# Patient Record
Sex: FEMALE | Race: WHITE | Employment: OTHER | URBAN - METROPOLITAN AREA
[De-identification: names, ages, dates, MRNs, and addresses within clinical notes are randomized per-mention and may not be internally consistent; named-entity substitution may affect disease eponyms.]

---

## 2018-08-27 ENCOUNTER — HOME HEALTH ADMISSION (OUTPATIENT)
Dept: HOME HEALTH SERVICES | Facility: HOME HEALTH | Age: 83
End: 2018-08-27
Payer: MEDICARE

## 2018-08-30 ENCOUNTER — HOME CARE VISIT (OUTPATIENT)
Dept: SCHEDULING | Facility: HOME HEALTH | Age: 83
End: 2018-08-30
Payer: MEDICARE

## 2018-08-30 VITALS
RESPIRATION RATE: 16 BRPM | SYSTOLIC BLOOD PRESSURE: 132 MMHG | DIASTOLIC BLOOD PRESSURE: 74 MMHG | HEART RATE: 75 BPM | OXYGEN SATURATION: 98 % | TEMPERATURE: 98.4 F

## 2018-08-30 PROCEDURE — 3331090002 HH PPS REVENUE DEBIT

## 2018-08-30 PROCEDURE — G0299 HHS/HOSPICE OF RN EA 15 MIN: HCPCS

## 2018-08-30 PROCEDURE — 3331090001 HH PPS REVENUE CREDIT

## 2018-08-30 PROCEDURE — 400013 HH SOC

## 2018-08-31 PROCEDURE — 3331090001 HH PPS REVENUE CREDIT

## 2018-08-31 PROCEDURE — 3331090002 HH PPS REVENUE DEBIT

## 2018-09-01 ENCOUNTER — HOME CARE VISIT (OUTPATIENT)
Dept: SCHEDULING | Facility: HOME HEALTH | Age: 83
End: 2018-09-01
Payer: MEDICARE

## 2018-09-01 PROCEDURE — 3331090002 HH PPS REVENUE DEBIT

## 2018-09-01 PROCEDURE — 3331090001 HH PPS REVENUE CREDIT

## 2018-09-02 PROCEDURE — 3331090002 HH PPS REVENUE DEBIT

## 2018-09-02 PROCEDURE — 3331090001 HH PPS REVENUE CREDIT

## 2018-09-03 ENCOUNTER — HOME CARE VISIT (OUTPATIENT)
Dept: HOME HEALTH SERVICES | Facility: HOME HEALTH | Age: 83
End: 2018-09-03
Payer: MEDICARE

## 2018-09-03 PROCEDURE — 3331090001 HH PPS REVENUE CREDIT

## 2018-09-03 PROCEDURE — 3331090002 HH PPS REVENUE DEBIT

## 2018-09-04 ENCOUNTER — HOME CARE VISIT (OUTPATIENT)
Dept: SCHEDULING | Facility: HOME HEALTH | Age: 83
End: 2018-09-04
Payer: MEDICARE

## 2018-09-04 VITALS
RESPIRATION RATE: 16 BRPM | TEMPERATURE: 98.8 F | OXYGEN SATURATION: 98 % | SYSTOLIC BLOOD PRESSURE: 128 MMHG | DIASTOLIC BLOOD PRESSURE: 70 MMHG

## 2018-09-04 PROCEDURE — 3331090001 HH PPS REVENUE CREDIT

## 2018-09-04 PROCEDURE — G0152 HHCP-SERV OF OT,EA 15 MIN: HCPCS

## 2018-09-04 PROCEDURE — 3331090002 HH PPS REVENUE DEBIT

## 2018-09-04 PROCEDURE — G0151 HHCP-SERV OF PT,EA 15 MIN: HCPCS

## 2018-09-05 VITALS
SYSTOLIC BLOOD PRESSURE: 130 MMHG | HEART RATE: 80 BPM | DIASTOLIC BLOOD PRESSURE: 76 MMHG | TEMPERATURE: 98.4 F | OXYGEN SATURATION: 96 % | RESPIRATION RATE: 24 BRPM

## 2018-09-05 PROCEDURE — 3331090001 HH PPS REVENUE CREDIT

## 2018-09-05 PROCEDURE — 3331090002 HH PPS REVENUE DEBIT

## 2018-09-06 ENCOUNTER — HOME CARE VISIT (OUTPATIENT)
Dept: SCHEDULING | Facility: HOME HEALTH | Age: 83
End: 2018-09-06
Payer: MEDICARE

## 2018-09-06 VITALS
RESPIRATION RATE: 18 BRPM | OXYGEN SATURATION: 96 % | DIASTOLIC BLOOD PRESSURE: 90 MMHG | SYSTOLIC BLOOD PRESSURE: 119 MMHG | TEMPERATURE: 98.3 F | HEART RATE: 87 BPM

## 2018-09-06 PROCEDURE — G0300 HHS/HOSPICE OF LPN EA 15 MIN: HCPCS

## 2018-09-06 PROCEDURE — 3331090001 HH PPS REVENUE CREDIT

## 2018-09-06 PROCEDURE — 3331090002 HH PPS REVENUE DEBIT

## 2018-09-06 PROCEDURE — G0152 HHCP-SERV OF OT,EA 15 MIN: HCPCS

## 2018-09-07 ENCOUNTER — HOME CARE VISIT (OUTPATIENT)
Dept: SCHEDULING | Facility: HOME HEALTH | Age: 83
End: 2018-09-07
Payer: MEDICARE

## 2018-09-07 ENCOUNTER — HOME CARE VISIT (OUTPATIENT)
Dept: HOME HEALTH SERVICES | Facility: HOME HEALTH | Age: 83
End: 2018-09-07
Payer: MEDICARE

## 2018-09-07 VITALS
OXYGEN SATURATION: 96 % | SYSTOLIC BLOOD PRESSURE: 147 MMHG | HEART RATE: 81 BPM | TEMPERATURE: 98 F | DIASTOLIC BLOOD PRESSURE: 85 MMHG

## 2018-09-07 VITALS
RESPIRATION RATE: 18 BRPM | OXYGEN SATURATION: 99 % | SYSTOLIC BLOOD PRESSURE: 110 MMHG | DIASTOLIC BLOOD PRESSURE: 70 MMHG | HEART RATE: 77 BPM | TEMPERATURE: 98.1 F

## 2018-09-07 PROCEDURE — G0300 HHS/HOSPICE OF LPN EA 15 MIN: HCPCS

## 2018-09-07 PROCEDURE — 3331090001 HH PPS REVENUE CREDIT

## 2018-09-07 PROCEDURE — G0157 HHC PT ASSISTANT EA 15: HCPCS

## 2018-09-07 PROCEDURE — 3331090002 HH PPS REVENUE DEBIT

## 2018-09-08 VITALS
RESPIRATION RATE: 18 BRPM | HEART RATE: 82 BPM | DIASTOLIC BLOOD PRESSURE: 70 MMHG | TEMPERATURE: 98.2 F | OXYGEN SATURATION: 95 % | SYSTOLIC BLOOD PRESSURE: 115 MMHG

## 2018-09-08 PROCEDURE — 3331090002 HH PPS REVENUE DEBIT

## 2018-09-08 PROCEDURE — 3331090001 HH PPS REVENUE CREDIT

## 2018-09-09 PROCEDURE — 3331090002 HH PPS REVENUE DEBIT

## 2018-09-09 PROCEDURE — 3331090001 HH PPS REVENUE CREDIT

## 2018-09-10 ENCOUNTER — HOME CARE VISIT (OUTPATIENT)
Dept: HOME HEALTH SERVICES | Facility: HOME HEALTH | Age: 83
End: 2018-09-10
Payer: MEDICARE

## 2018-09-10 ENCOUNTER — HOME CARE VISIT (OUTPATIENT)
Dept: SCHEDULING | Facility: HOME HEALTH | Age: 83
End: 2018-09-10
Payer: MEDICARE

## 2018-09-10 VITALS
TEMPERATURE: 98.7 F | OXYGEN SATURATION: 96 % | DIASTOLIC BLOOD PRESSURE: 85 MMHG | RESPIRATION RATE: 18 BRPM | HEART RATE: 75 BPM | SYSTOLIC BLOOD PRESSURE: 144 MMHG

## 2018-09-10 PROCEDURE — 3331090001 HH PPS REVENUE CREDIT

## 2018-09-10 PROCEDURE — 3331090002 HH PPS REVENUE DEBIT

## 2018-09-10 PROCEDURE — G0152 HHCP-SERV OF OT,EA 15 MIN: HCPCS

## 2018-09-10 PROCEDURE — G0300 HHS/HOSPICE OF LPN EA 15 MIN: HCPCS

## 2018-09-11 PROCEDURE — 3331090001 HH PPS REVENUE CREDIT

## 2018-09-11 PROCEDURE — 3331090002 HH PPS REVENUE DEBIT

## 2018-09-12 ENCOUNTER — HOME CARE VISIT (OUTPATIENT)
Dept: HOME HEALTH SERVICES | Facility: HOME HEALTH | Age: 83
End: 2018-09-12
Payer: MEDICARE

## 2018-09-12 ENCOUNTER — HOME CARE VISIT (OUTPATIENT)
Dept: SCHEDULING | Facility: HOME HEALTH | Age: 83
End: 2018-09-12
Payer: MEDICARE

## 2018-09-12 VITALS
HEART RATE: 68 BPM | SYSTOLIC BLOOD PRESSURE: 138 MMHG | OXYGEN SATURATION: 96 % | DIASTOLIC BLOOD PRESSURE: 62 MMHG | RESPIRATION RATE: 16 BRPM | TEMPERATURE: 98.4 F

## 2018-09-12 PROCEDURE — G0157 HHC PT ASSISTANT EA 15: HCPCS

## 2018-09-12 PROCEDURE — 3331090002 HH PPS REVENUE DEBIT

## 2018-09-12 PROCEDURE — G0152 HHCP-SERV OF OT,EA 15 MIN: HCPCS

## 2018-09-12 PROCEDURE — 3331090001 HH PPS REVENUE CREDIT

## 2018-09-13 ENCOUNTER — HOME CARE VISIT (OUTPATIENT)
Dept: SCHEDULING | Facility: HOME HEALTH | Age: 83
End: 2018-09-13
Payer: MEDICARE

## 2018-09-13 PROCEDURE — G0300 HHS/HOSPICE OF LPN EA 15 MIN: HCPCS

## 2018-09-13 PROCEDURE — 3331090002 HH PPS REVENUE DEBIT

## 2018-09-13 PROCEDURE — 3331090001 HH PPS REVENUE CREDIT

## 2018-09-14 ENCOUNTER — HOME CARE VISIT (OUTPATIENT)
Dept: SCHEDULING | Facility: HOME HEALTH | Age: 83
End: 2018-09-14
Payer: MEDICARE

## 2018-09-14 VITALS
HEART RATE: 81 BPM | DIASTOLIC BLOOD PRESSURE: 73 MMHG | SYSTOLIC BLOOD PRESSURE: 134 MMHG | TEMPERATURE: 98.5 F | OXYGEN SATURATION: 98 %

## 2018-09-14 PROCEDURE — 3331090001 HH PPS REVENUE CREDIT

## 2018-09-14 PROCEDURE — 3331090002 HH PPS REVENUE DEBIT

## 2018-09-14 PROCEDURE — G0157 HHC PT ASSISTANT EA 15: HCPCS

## 2018-09-15 PROCEDURE — 3331090002 HH PPS REVENUE DEBIT

## 2018-09-15 PROCEDURE — 3331090001 HH PPS REVENUE CREDIT

## 2018-09-16 ENCOUNTER — HOME CARE VISIT (OUTPATIENT)
Dept: HOME HEALTH SERVICES | Facility: HOME HEALTH | Age: 83
End: 2018-09-16
Payer: MEDICARE

## 2018-09-16 VITALS
RESPIRATION RATE: 18 BRPM | DIASTOLIC BLOOD PRESSURE: 80 MMHG | TEMPERATURE: 98.1 F | SYSTOLIC BLOOD PRESSURE: 140 MMHG | OXYGEN SATURATION: 98 % | OXYGEN SATURATION: 96 % | DIASTOLIC BLOOD PRESSURE: 72 MMHG | SYSTOLIC BLOOD PRESSURE: 124 MMHG | RESPIRATION RATE: 18 BRPM | HEART RATE: 71 BPM | TEMPERATURE: 98.1 F | HEART RATE: 76 BPM

## 2018-09-16 PROCEDURE — 3331090001 HH PPS REVENUE CREDIT

## 2018-09-16 PROCEDURE — 3331090002 HH PPS REVENUE DEBIT

## 2018-09-17 PROCEDURE — 3331090001 HH PPS REVENUE CREDIT

## 2018-09-17 PROCEDURE — 3331090002 HH PPS REVENUE DEBIT

## 2018-09-18 ENCOUNTER — HOME CARE VISIT (OUTPATIENT)
Dept: SCHEDULING | Facility: HOME HEALTH | Age: 83
End: 2018-09-18
Payer: MEDICARE

## 2018-09-18 ENCOUNTER — HOME CARE VISIT (OUTPATIENT)
Dept: HOME HEALTH SERVICES | Facility: HOME HEALTH | Age: 83
End: 2018-09-18
Payer: MEDICARE

## 2018-09-18 VITALS
TEMPERATURE: 98.7 F | DIASTOLIC BLOOD PRESSURE: 78 MMHG | OXYGEN SATURATION: 97 % | RESPIRATION RATE: 17 BRPM | SYSTOLIC BLOOD PRESSURE: 150 MMHG | HEART RATE: 84 BPM

## 2018-09-18 PROCEDURE — G0157 HHC PT ASSISTANT EA 15: HCPCS

## 2018-09-18 PROCEDURE — 3331090001 HH PPS REVENUE CREDIT

## 2018-09-18 PROCEDURE — G0300 HHS/HOSPICE OF LPN EA 15 MIN: HCPCS

## 2018-09-18 PROCEDURE — 3331090002 HH PPS REVENUE DEBIT

## 2018-09-19 ENCOUNTER — HOME CARE VISIT (OUTPATIENT)
Dept: SCHEDULING | Facility: HOME HEALTH | Age: 83
End: 2018-09-19
Payer: MEDICARE

## 2018-09-19 VITALS
SYSTOLIC BLOOD PRESSURE: 122 MMHG | RESPIRATION RATE: 18 BRPM | OXYGEN SATURATION: 97 % | TEMPERATURE: 97.9 F | HEART RATE: 70 BPM | DIASTOLIC BLOOD PRESSURE: 70 MMHG

## 2018-09-19 VITALS
SYSTOLIC BLOOD PRESSURE: 129 MMHG | OXYGEN SATURATION: 98 % | HEART RATE: 88 BPM | DIASTOLIC BLOOD PRESSURE: 71 MMHG | TEMPERATURE: 97.5 F | RESPIRATION RATE: 17 BRPM

## 2018-09-19 PROCEDURE — 3331090001 HH PPS REVENUE CREDIT

## 2018-09-19 PROCEDURE — 3331090002 HH PPS REVENUE DEBIT

## 2018-09-19 PROCEDURE — G0157 HHC PT ASSISTANT EA 15: HCPCS

## 2018-09-20 ENCOUNTER — HOME CARE VISIT (OUTPATIENT)
Dept: SCHEDULING | Facility: HOME HEALTH | Age: 83
End: 2018-09-20
Payer: MEDICARE

## 2018-09-20 ENCOUNTER — HOME CARE VISIT (OUTPATIENT)
Dept: HOME HEALTH SERVICES | Facility: HOME HEALTH | Age: 83
End: 2018-09-20
Payer: MEDICARE

## 2018-09-20 PROCEDURE — G0300 HHS/HOSPICE OF LPN EA 15 MIN: HCPCS

## 2018-09-20 PROCEDURE — 3331090001 HH PPS REVENUE CREDIT

## 2018-09-20 PROCEDURE — 3331090002 HH PPS REVENUE DEBIT

## 2018-09-21 PROCEDURE — 3331090002 HH PPS REVENUE DEBIT

## 2018-09-21 PROCEDURE — 3331090001 HH PPS REVENUE CREDIT

## 2018-09-22 PROCEDURE — 3331090001 HH PPS REVENUE CREDIT

## 2018-09-22 PROCEDURE — 3331090002 HH PPS REVENUE DEBIT

## 2018-09-23 PROCEDURE — 3331090001 HH PPS REVENUE CREDIT

## 2018-09-23 PROCEDURE — 3331090002 HH PPS REVENUE DEBIT

## 2018-09-24 ENCOUNTER — HOME CARE VISIT (OUTPATIENT)
Dept: SCHEDULING | Facility: HOME HEALTH | Age: 83
End: 2018-09-24
Payer: MEDICARE

## 2018-09-24 VITALS
OXYGEN SATURATION: 97 % | TEMPERATURE: 98.2 F | DIASTOLIC BLOOD PRESSURE: 85 MMHG | RESPIRATION RATE: 17 BRPM | HEART RATE: 79 BPM | SYSTOLIC BLOOD PRESSURE: 140 MMHG

## 2018-09-24 PROCEDURE — 3331090002 HH PPS REVENUE DEBIT

## 2018-09-24 PROCEDURE — 3331090001 HH PPS REVENUE CREDIT

## 2018-09-24 PROCEDURE — G0157 HHC PT ASSISTANT EA 15: HCPCS

## 2018-09-25 PROCEDURE — 3331090001 HH PPS REVENUE CREDIT

## 2018-09-25 PROCEDURE — 3331090002 HH PPS REVENUE DEBIT

## 2018-09-26 ENCOUNTER — HOME CARE VISIT (OUTPATIENT)
Dept: HOME HEALTH SERVICES | Facility: HOME HEALTH | Age: 83
End: 2018-09-26
Payer: MEDICARE

## 2018-09-26 ENCOUNTER — HOME CARE VISIT (OUTPATIENT)
Dept: SCHEDULING | Facility: HOME HEALTH | Age: 83
End: 2018-09-26
Payer: MEDICARE

## 2018-09-26 PROCEDURE — G0151 HHCP-SERV OF PT,EA 15 MIN: HCPCS

## 2018-09-26 PROCEDURE — G0300 HHS/HOSPICE OF LPN EA 15 MIN: HCPCS

## 2018-09-26 PROCEDURE — 3331090001 HH PPS REVENUE CREDIT

## 2018-09-26 PROCEDURE — 3331090002 HH PPS REVENUE DEBIT

## 2018-09-27 VITALS
OXYGEN SATURATION: 97 % | DIASTOLIC BLOOD PRESSURE: 70 MMHG | TEMPERATURE: 97.8 F | HEART RATE: 108 BPM | SYSTOLIC BLOOD PRESSURE: 122 MMHG | RESPIRATION RATE: 20 BRPM

## 2018-09-27 PROCEDURE — 3331090002 HH PPS REVENUE DEBIT

## 2018-09-27 PROCEDURE — 3331090001 HH PPS REVENUE CREDIT

## 2018-09-28 PROCEDURE — 3331090002 HH PPS REVENUE DEBIT

## 2018-09-28 PROCEDURE — 3331090001 HH PPS REVENUE CREDIT

## 2018-09-29 PROCEDURE — 3331090002 HH PPS REVENUE DEBIT

## 2018-09-29 PROCEDURE — 3331090001 HH PPS REVENUE CREDIT

## 2018-09-30 PROCEDURE — 3331090002 HH PPS REVENUE DEBIT

## 2018-09-30 PROCEDURE — 3331090001 HH PPS REVENUE CREDIT

## 2018-10-01 VITALS
OXYGEN SATURATION: 99 % | HEART RATE: 80 BPM | OXYGEN SATURATION: 97 % | SYSTOLIC BLOOD PRESSURE: 130 MMHG | HEART RATE: 88 BPM | DIASTOLIC BLOOD PRESSURE: 70 MMHG | TEMPERATURE: 98.4 F | RESPIRATION RATE: 18 BRPM | RESPIRATION RATE: 18 BRPM | SYSTOLIC BLOOD PRESSURE: 126 MMHG | DIASTOLIC BLOOD PRESSURE: 68 MMHG | TEMPERATURE: 98.5 F

## 2020-05-24 ENCOUNTER — HOSPITAL ENCOUNTER (EMERGENCY)
Age: 85
Discharge: HOME | End: 2020-05-24
Attending: EMERGENCY MEDICINE
Payer: MEDICARE

## 2020-05-24 ENCOUNTER — APPOINTMENT (OUTPATIENT)
Dept: GENERAL RADIOLOGY | Age: 85
End: 2020-05-24
Attending: NURSE PRACTITIONER
Payer: MEDICARE

## 2020-05-24 VITALS
BODY MASS INDEX: 25.71 KG/M2 | SYSTOLIC BLOOD PRESSURE: 153 MMHG | HEART RATE: 74 BPM | HEIGHT: 64 IN | DIASTOLIC BLOOD PRESSURE: 83 MMHG | TEMPERATURE: 98.7 F | OXYGEN SATURATION: 100 % | RESPIRATION RATE: 16 BRPM | WEIGHT: 150.6 LBS

## 2020-05-24 DIAGNOSIS — E87.6 ACUTE HYPOKALEMIA: ICD-10-CM
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
## 2020-05-24 DIAGNOSIS — S51.011A SKIN TEAR OF RIGHT ELBOW WITHOUT COMPLICATION, INITIAL ENCOUNTER: ICD-10-CM

## 2020-05-24 LAB
ALBUMIN SERPL-MCNC: 3.8 G/DL (ref 3.5–5)
ALBUMIN/GLOB SERPL: 1.2 {RATIO} (ref 1.1–2.2)
ALP SERPL-CCNC: 134 U/L (ref 45–117)
ALT SERPL-CCNC: 25 U/L (ref 12–78)
ANION GAP SERPL CALC-SCNC: 9 MMOL/L (ref 5–15)
APPEARANCE UR: CLEAR
AST SERPL-CCNC: 30 U/L (ref 15–37)
BACTERIA URNS QL MICRO: NEGATIVE /HPF
BASOPHILS # BLD: 0.1 K/UL (ref 0–0.1)
BASOPHILS NFR BLD: 1 % (ref 0–1)
BILIRUB SERPL-MCNC: 0.7 MG/DL (ref 0.2–1)
BILIRUB UR QL: NEGATIVE
BUN SERPL-MCNC: 16 MG/DL (ref 6–20)
BUN/CREAT SERPL: 34 (ref 12–20)
CALCIUM SERPL-MCNC: 8.8 MG/DL (ref 8.5–10.1)
CHLORIDE SERPL-SCNC: 91 MMOL/L (ref 97–108)
CO2 SERPL-SCNC: 30 MMOL/L (ref 21–32)
COLOR UR: NORMAL
CREAT SERPL-MCNC: 0.47 MG/DL (ref 0.55–1.02)
DIFFERENTIAL METHOD BLD: ABNORMAL
EOSINOPHIL # BLD: 0.1 K/UL (ref 0–0.4)
EOSINOPHIL NFR BLD: 2 % (ref 0–7)
EPITH CASTS URNS QL MICRO: NORMAL /LPF
ERYTHROCYTE [DISTWIDTH] IN BLOOD BY AUTOMATED COUNT: 13.2 % (ref 11.5–14.5)
GLOBULIN SER CALC-MCNC: 3.1 G/DL (ref 2–4)
GLUCOSE SERPL-MCNC: 101 MG/DL (ref 65–100)
GLUCOSE UR STRIP.AUTO-MCNC: NEGATIVE MG/DL
HCT VFR BLD AUTO: 38.5 % (ref 35–47)
HGB BLD-MCNC: 13.1 G/DL (ref 11.5–16)
HGB UR QL STRIP: NEGATIVE
IMM GRANULOCYTES # BLD AUTO: 0 K/UL (ref 0–0.04)
IMM GRANULOCYTES NFR BLD AUTO: 1 % (ref 0–0.5)
KETONES UR QL STRIP.AUTO: NEGATIVE MG/DL
LEUKOCYTE ESTERASE UR QL STRIP.AUTO: NEGATIVE
LYMPHOCYTES # BLD: 0.8 K/UL (ref 0.8–3.5)
LYMPHOCYTES NFR BLD: 12 % (ref 12–49)
MAGNESIUM SERPL-MCNC: 1.9 MG/DL (ref 1.6–2.4)
MCH RBC QN AUTO: 28.9 PG (ref 26–34)
MCHC RBC AUTO-ENTMCNC: 34 G/DL (ref 30–36.5)
MCV RBC AUTO: 84.8 FL (ref 80–99)
MONOCYTES # BLD: 0.6 K/UL (ref 0–1)
MONOCYTES NFR BLD: 8 % (ref 5–13)
NEUTS SEG # BLD: 5.6 K/UL (ref 1.8–8)
NEUTS SEG NFR BLD: 76 % (ref 32–75)
NITRITE UR QL STRIP.AUTO: NEGATIVE
NRBC # BLD: 0 K/UL (ref 0–0.01)
NRBC BLD-RTO: 0 PER 100 WBC
PH UR STRIP: 7.5 [PH] (ref 5–8)
PLATELET # BLD AUTO: 377 K/UL (ref 150–400)
PMV BLD AUTO: 9.4 FL (ref 8.9–12.9)
POTASSIUM SERPL-SCNC: 2.6 MMOL/L (ref 3.5–5.1)
PROT SERPL-MCNC: 6.9 G/DL (ref 6.4–8.2)
PROT UR STRIP-MCNC: NEGATIVE MG/DL
RBC # BLD AUTO: 4.54 M/UL (ref 3.8–5.2)
RBC #/AREA URNS HPF: NORMAL /HPF (ref 0–5)
SODIUM SERPL-SCNC: 130 MMOL/L (ref 136–145)
SP GR UR REFRACTOMETRY: <1.005 (ref 1–1.03)
TROPONIN I SERPL-MCNC: <0.05 NG/ML
UA: UC IF INDICATED,UAUC: NORMAL
UROBILINOGEN UR QL STRIP.AUTO: 0.2 EU/DL (ref 0.2–1)
WBC # BLD AUTO: 7.3 K/UL (ref 3.6–11)
WBC URNS QL MICRO: NORMAL /HPF (ref 0–4)

## 2020-05-24 PROCEDURE — 74011000250 HC RX REV CODE- 250: Performed by: NURSE PRACTITIONER

## 2020-05-24 PROCEDURE — 36415 COLL VENOUS BLD VENIPUNCTURE: CPT

## 2020-05-24 PROCEDURE — 73502 X-RAY EXAM HIP UNI 2-3 VIEWS: CPT

## 2020-05-24 PROCEDURE — 80053 COMPREHEN METABOLIC PANEL: CPT

## 2020-05-24 PROCEDURE — 70450 CT HEAD/BRAIN W/O DYE: CPT

## 2020-05-24 PROCEDURE — 85025 COMPLETE CBC W/AUTO DIFF WBC: CPT

## 2020-05-24 PROCEDURE — 74011250637 HC RX REV CODE- 250/637: Performed by: EMERGENCY MEDICINE

## 2020-05-24 PROCEDURE — 73080 X-RAY EXAM OF ELBOW: CPT

## 2020-05-24 PROCEDURE — 84484 ASSAY OF TROPONIN QUANT: CPT

## 2020-05-24 PROCEDURE — 99284 EMERGENCY DEPT VISIT MOD MDM: CPT

## 2020-05-24 PROCEDURE — 83735 ASSAY OF MAGNESIUM: CPT

## 2020-05-24 PROCEDURE — 93005 ELECTROCARDIOGRAM TRACING: CPT

## 2020-05-24 PROCEDURE — 81001 URINALYSIS AUTO W/SCOPE: CPT

## 2020-05-24 RX ORDER — BACITRACIN 500 [USP'U]/G
OINTMENT TOPICAL 3 TIMES DAILY
Qty: 1 TUBE | Refills: 0 | Status: SHIPPED | OUTPATIENT
Start: 2020-05-24 | End: 2020-06-03

## 2020-05-24 RX ORDER — POTASSIUM CHLORIDE 1.5 G/1.77G
20 POWDER, FOR SOLUTION ORAL 2 TIMES DAILY WITH MEALS
Qty: 20 PACKET | Refills: 0 | Status: SHIPPED | OUTPATIENT
Start: 2020-05-24

## 2020-05-24 RX ORDER — POTASSIUM CHLORIDE 750 MG/1
40 TABLET, FILM COATED, EXTENDED RELEASE ORAL
Status: COMPLETED | OUTPATIENT
Start: 2020-05-24 | End: 2020-05-24

## 2020-05-24 RX ADMIN — BACITRACIN ZINC, NEOMYCIN SULFATE, POLYMYXIN B SULFATE 1 PACKET: 3.5; 5000; 4 OINTMENT TOPICAL at 18:59

## 2020-05-24 RX ADMIN — POTASSIUM CHLORIDE 40 MEQ: 750 TABLET, EXTENDED RELEASE ORAL at 20:47

## 2020-05-24 NOTE — ED NOTES
Patient here via EMS with c/o fall. Patient states that she was in her bathroom when she fell, reporting \"my whole [right] side gave out on me! \"  Patient alert and oriented on arrival, RONNELL. Patient states that she hit her head but denies LOC. Patient reports pain to left side of head, right hip, and right elbow. Patient with bleeding to elbow, skin tare present. Patient reports use of canes and walker at home. Patient reports hx of COPD with home oxygen use, stating 2 to 3 liters of oxygen at baseline, stating she wears oxygen \"about 95% of the time\". Emergency Department Nursing Plan of Care       The Nursing Plan of Care is developed from the Nursing assessment and Emergency Department Attending provider initial evaluation. The plan of care may be reviewed in the ED Provider note.     The Plan of Care was developed with the following considerations:   Patient / Family readiness to learn indicated by:verbalized understanding  Persons(s) to be included in education: patient  Barriers to Learning/Limitations:No    Signed     Greg Ni RN    5/24/2020   6:01 PM

## 2020-05-24 NOTE — ED PROVIDER NOTES
EMERGENCY DEPARTMENT HISTORY AND PHYSICAL EXAM    Date: 5/24/2020  Patient Name: Janny Mustafa    History of Presenting Illness     Chief Complaint   Patient presents with    Fall         History Provided By: Patient    Chief Complaint: hip pain  Duration: onset just PTA   Timing:  Acute  Location: right hip  Quality: Aching  Severity: 4 out of 10  Modifying Factors: none  Associated Symptoms: elbow pain      HPI: Janny Mustafa is a 80 y.o. female with a PMH of hypertension  vertigo who presents with an onset just prior to arrival.  Patient states she was in her bathroom lost her balance because her knees are weak and fell onto her right hip and right elbow. Patient states she also has pain in her right elbow. She states she has had a large amount of bleeding from a bruise on her right elbow. patient states her daughter witnessed the fall and said that patient hit her head on the cabinet. Patient denies loss of consciousness. Patient denies other injuries. She specifically denies dizziness shortness of breath chest pain abdominal pain nausea vomiting diarrhea fever numbness or tingling. She has no other complaints at this time. PCP: Chivo Flores MD    Current Outpatient Medications   Medication Sig Dispense Refill    potassium chloride (KLOR-CON) 20 mEq pack Take 1 Packet by mouth two (2) times daily (with meals). 20 Packet 0    bacitracin (BACITRACIN) 500 unit/gram oint Apply  to affected area three (3) times daily for 10 days. Apply to affected area 1 Tube 0    OTHER,NON-FORMULARY, Take 1 Tab by mouth daily. Potassium Heart Health 99mg Tablet one tablet by mouth daily      TURMERIC-TURMERIC ROOT EXTRACT PO Take 1 Tab by mouth daily. Turmeric 500mg Tablet      nystatin (MYCOSTATIN) 100,000 unit/mL suspension Take 500,000 Units by mouth four (4) times daily.  lutein 20 mg tab Take 1 Tab by mouth daily.  levothyroxine (SYNTHROID) 150 mcg tablet Take 150 mcg by mouth daily.  atorvastatin (LIPITOR) 20 mg tablet Take 10 mg by mouth daily.  hydroCHLOROthiazide (HYDRODIURIL) 25 mg tablet Take 25 mg by mouth daily.  levoFLOXacin (LEVAQUIN) 750 mg tablet Take 750 mg by mouth daily.  predniSONE (DELTASONE) 10 mg tablet Take 10 mg by mouth daily. take 6 tabs by mouth for 4 days  take 4 tabs by mouth for 4 days  take 2 tabs by mouth for 4 days      albuterol (ACCUNEB) 1.25 mg/3 mL nebu 1.25 mg by IntraNASal route four (4) times daily.  folic acid/multivit-min/lutein (CENTRUM SILVER PO) Take 1 Tab by mouth daily.  montelukast (SINGULAIR) 5 mg chewable tablet Take 10 mg by mouth nightly.  acetaminophen (TYLENOL) 500 mg tablet Take 1,000 mg by mouth every six (6) hours as needed for Pain.  guaiFENesin ER (MUCINEX) 600 mg ER tablet Take 600 mg by mouth two (2) times a day.  polyethylene glycol (MIRALAX) 17 gram packet Take 17 g by mouth daily.  OXYGEN-AIR DELIVERY SYSTEMS 5 L by Nasal route continuous.  Diclofenac Sodium (VOLTAREN) 1 % topical gel Apply 4 g to affected area four (4) times daily as needed (pain).  tiotropium (SPIRIVA WITH HANDIHALER) 18 mcg inhalation capsule Take 1 Cap by inhalation daily.  terazosin (HYTRIN) 2 mg capsule Take 1 mg by mouth nightly.  diltiazem hcl 300 mg Tb24 Take 1 Cap by mouth daily. Past History     Past Medical History:  Past Medical History:   Diagnosis Date    Hypertension     Other ill-defined conditions(799.89)     vertigo       Past Surgical History:  Past Surgical History:   Procedure Laterality Date    HX CHOLECYSTECTOMY      removed    HX HEENT      cataract surgery    HX ORTHOPAEDIC      arthhroscopic right knee & left shoulder       Family History:  History reviewed. No pertinent family history.     Social History:  Social History     Tobacco Use    Smoking status: Former Smoker    Smokeless tobacco: Former User   Substance Use Topics    Alcohol use: No    Drug use: Not on file       Allergies: Allergies   Allergen Reactions    Codeine Unknown (comments)    Penicillin G Unknown (comments)         Review of Systems   Review of Systems   Constitutional: Negative for fatigue and fever. Respiratory: Negative for shortness of breath and wheezing. Cardiovascular: Negative for chest pain and palpitations. Gastrointestinal: Negative for abdominal pain. Musculoskeletal: Negative for arthralgias, myalgias, neck pain and neck stiffness. Elbow pain hip pain   Skin: Positive for wound. Negative for pallor and rash. Neurological: Negative for dizziness, tremors, weakness and headaches. Hematological: Negative for adenopathy. All other systems reviewed and are negative. Physical Exam     Vitals:    05/24/20 2000 05/24/20 2015 05/24/20 2030 05/24/20 2045   BP:       Pulse:       Resp:       Temp:       SpO2: 98% 97% 99% 100%   Weight:       Height:         Physical Exam  Vitals signs and nursing note reviewed. Constitutional:       General: She is not in acute distress. Appearance: She is well-developed. HENT:      Head: Normocephalic and atraumatic. Right Ear: External ear normal.      Left Ear: External ear normal.      Nose: Nose normal.   Eyes:      Conjunctiva/sclera: Conjunctivae normal.   Neck:      Musculoskeletal: Normal range of motion and neck supple. Cardiovascular:      Rate and Rhythm: Normal rate and regular rhythm. Heart sounds: Normal heart sounds. Pulmonary:      Effort: Pulmonary effort is normal. No respiratory distress. Breath sounds: Normal breath sounds. No wheezing. Abdominal:      General: Bowel sounds are normal.      Palpations: Abdomen is soft. Tenderness: There is no abdominal tenderness. Musculoskeletal: Normal range of motion. Comments: R hip full passive ROM   Lymphadenopathy:      Cervical: No cervical adenopathy. Skin:     General: Skin is warm and dry. Findings: No rash. Comments: 4cm skin tear right elbow   Neurological:      Mental Status: She is alert and oriented to person, place, and time. Cranial Nerves: No cranial nerve deficit. Coordination: Coordination normal.   Psychiatric:         Behavior: Behavior normal.         Thought Content: Thought content normal.         Judgment: Judgment normal.           Diagnostic Study Results     Labs -     No results found for this or any previous visit (from the past 12 hour(s)). Radiologic Studies -   XR HIP RT W OR WO PELV 2-3 VWS   Final Result   IMPRESSION:    1. No evidence of acute fracture or dislocation. 2. 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 MR can be performed for   confirmation further evaluation, as indicated. CT HEAD WO CONT   Final Result   IMPRESSION: No acute intracranial hemorrhage, mass or infarct. XR ELBOW RT MIN 3 V   Final Result   IMPRESSION: No acute fracture or dislocation. CT Results  (Last 48 hours)               05/24/20 1831  CT HEAD WO CONT Final result    Impression:  IMPRESSION: No acute intracranial hemorrhage, mass or infarct. Narrative:  INDICATION: Fall. Exam: Noncontrast CT of the brain is performed with 5 mm collimation. CT dose reduction was achieved with the use of the standardized protocol   tailored for this examination and automatic exposure control for dose   modulation. Direct comparison to prior CT dated February 2012. FINDINGS: There is age-appropriate diffuse cortical atrophy. There is no acute   intracranial hemorrhage, mass, mass effect or herniation. Ventricular system is   normal. The gray-white matter differentiation is well-preserved. The mastoid air   cells are well pneumatized. The visualized paranasal sinuses are normal.               CXR Results  (Last 48 hours)    None            Medical Decision Making   I am the first provider for this patient.     I reviewed the vital signs, available nursing notes, past medical history, past surgical history, family history and social history. Vital Signs-Reviewed the patient's vital signs. Records Reviewed: Nursing Notes    ED Course as of May 25 1729   Sun May 24, 2020   2001 Lab results pending. Care of patient to attending    [AN]   2056 CT imaging and x-rays negative, labs reviewed, low K, will replete, pt is already on oral home K supplements at home, will increase dose and have patient follow-up with PCP. [HW]      ED Course User Index  [AN] aMgda Diaz NP  [HW] Siva Fay MD          Disposition:  home    DISCHARGE NOTE:     Patient discharged by attending. Follow-up Information     Follow up With Specialties Details Why Contact Info    Milton Ward MD [address] [id number] [facility] [phone]      [facility] EMERGENCY DEPT Emergency Medicine  As needed, If symptoms worsen [address]          Discharge Medication List as of 5/24/2020  8:58 PM      START taking these medications    Details   potassium chloride (KLOR-CON) 20 mEq pack Take 1 Packet by mouth two (2) times daily (with meals). , Print, Disp-20 Packet, R-0      bacitracin (BACITRACIN) 500 unit/gram oint Apply  to affected area three (3) times daily for 10 days. Apply to affected area, Print, Disp-1 Tube, R-0         CONTINUE these medications which have NOT CHANGED    Details   OTHER,NON-FORMULARY, Take 1 Tab by mouth daily. Potassium Heart Health 99mg Tablet one tablet by mouth daily, Historical Med      TURMERIC-TURMERIC ROOT EXTRACT PO Take 1 Tab by mouth daily. Turmeric 500mg Tablet, Historical Med      nystatin (MYCOSTATIN) 100,000 unit/mL suspension Take 500,000 Units by mouth four (4) times daily. , Historical Med      lutein 20 mg tab Take 1 Tab by mouth daily. , Historical Med      levothyroxine (SYNTHROID) 150 mcg tablet Take 150 mcg by mouth daily. , Historical Med      atorvastatin (LIPITOR) 20 mg tablet Take 10 mg by mouth daily. , Historical Med      hydroCHLOROthiazide (HYDRODIURIL) 25 mg tablet Take 25 mg by mouth daily. , Historical Med      levoFLOXacin (LEVAQUIN) 750 mg tablet Take 750 mg by mouth daily. , Historical Med      predniSONE (DELTASONE) 10 mg tablet Take 10 mg by mouth daily. take 6 tabs by mouth for 4 days  take 4 tabs by mouth for 4 days  take 2 tabs by mouth for 4 days, Historical Med      albuterol (ACCUNEB) 1.25 mg/3 mL nebu 1.25 mg by IntraNASal route four (4) times daily. , Historical Med      folic acid/multivit-min/lutein (CENTRUM SILVER PO) Take 1 Tab by mouth daily. , Historical Med      montelukast (SINGULAIR) 5 mg chewable tablet Take 10 mg by mouth nightly., Historical Med      acetaminophen (TYLENOL) 500 mg tablet Take 1,000 mg by mouth every six (6) hours as needed for Pain., Historical Med      guaiFENesin ER (MUCINEX) 600 mg ER tablet Take 600 mg by mouth two (2) times a day., Historical Med      polyethylene glycol (MIRALAX) 17 gram packet Take 17 g by mouth daily. , Historical Med      OXYGEN-AIR DELIVERY SYSTEMS 5 L by Nasal route continuous. , Historical Med      Diclofenac Sodium (VOLTAREN) 1 % topical gel Apply 4 g to affected area four (4) times daily as needed (pain). , Historical Med      tiotropium (SPIRIVA WITH HANDIHALER) 18 mcg inhalation capsule Not intended for PRN useTake 1 Cap by inhalation daily. Historical Med, 1 Cap      terazosin (HYTRIN) 2 mg capsule Take 1 mg by mouth nightly., Historical Med      diltiazem hcl 300 mg Tb24 Take 1 Cap by mouth daily. Historical Med, 1 Cap             Provider Notes (Medical Decision Making):   DDX GLF head contusion SAH hip contusion skin tear hyponatremia UTI  Procedures:  Procedures    Please note that this dictation was completed with Dragon, computer voice recognition software.   Quite often unanticipated grammatical, syntax, homophones, and other interpretive errors are inadvertently transcribed by the computer software. Please disregard these errors. Additionally, please excuse any errors that have escaped final proofreading. Diagnosis     Clinical Impression:   1. Fall from ground level    2. Skin tear of right elbow without complication, initial encounter    3.  Acute hypokalemia

## 2020-05-24 NOTE — ED NOTES
Bedside shift change report given to Taco Waggoner RN (oncoming nurse) by Srinivas Luong RN (offgoing nurse). Report included the following information SBAR, ED Summary, Procedure Summary, MAR and Recent Results.

## 2020-05-25 NOTE — DISCHARGE INSTRUCTIONS
Patient Education        Hypokalemia: Care Instructions  Your Care Instructions    Hypokalemia (say \"ve-sw-fks-GARY-tanya-uh\") is a low level of potassium. The heart, muscles, kidneys, and nervous system all need potassium to work well. This problem has many different causes. Kidney problems, diet, and medicines like diuretics and laxatives can cause it. So can vomiting or diarrhea. In some cases, cancer is the cause. Your doctor may do tests to find the cause of your low potassium levels. You may need medicines to bring your potassium levels back to normal. You may also need regular blood tests to check your potassium. If you have very low potassium, you may need intravenous (IV) medicines. You also may need tests to check the electrical activity of your heart. Heart problems caused by low potassium levels can be very serious. Follow-up care is a key part of your treatment and safety. Be sure to make and go to all appointments, and call your doctor if you are having problems. It's also a good idea to know your test results and keep a list of the medicines you take. How can you care for yourself at home? · If your doctor recommends it, eat foods that have a lot of potassium. These include fresh fruits, juices, and vegetables. They also include nuts, beans, and milk. · Be safe with medicines. If your doctor prescribes medicines or potassium supplements, take them exactly as directed. Call your doctor if you have any problems with your medicines. · Get your potassium levels tested as often as your doctor tells you. When should you call for help?    · You feel like your heart is missing beats.  Heart problems caused by low potassium can cause death.     · You passed out (lost consciousness).     · You have a seizure.    Call your doctor now or seek immediate medical care if:    · You feel weak or unusually tired.     · You have severe arm or leg cramps.     · You have tingling or numbness.     · You feel sick to your stomach, or you vomit.     · You have belly cramps.     · You feel bloated or constipated.     · You have to urinate a lot.     · You feel very thirsty most of the time.     · You are dizzy or lightheaded, or you feel like you may faint.     · You feel depressed, or you lose touch with reality.    Watch closely for changes in your health, and be sure to contact your doctor if:    · You do not get better as expected. Where can you learn more? Go to http://joe-laura.info/  Enter G358 in the search box to learn more about \"Hypokalemia: Care Instructions. \"  Current as of: July 28, 2019Content Version: 12.4  © 8211-2799 [employer]. Care instructions adapted under license by [employer] (which disclaims liability or warranty for this information). If you have questions about a medical condition or this instruction, always ask your healthcare professional. [address] any warranty or liability for your use of this information.

## 2020-05-25 NOTE — ED NOTES
Patient (s)  given copy of dc instructions and 2 script(s). Patient (s)  verbalized understanding of instructions and script (s). Patient given a current medication reconciliation form and verbalized understanding of their medications. Patient (s) verbalized understanding of the importance of discussing medications with  his or her physician or clinic they will be following up with. Patient alert and oriented and in no acute distress. Patient discharged home ambulatory with self/daughter.

## 2020-05-26 LAB
ATRIAL RATE: 76 BPM
CALCULATED P AXIS, ECG09: 93 DEGREES
CALCULATED R AXIS, ECG10: 42 DEGREES
CALCULATED T AXIS, ECG11: -23 DEGREES
DIAGNOSIS, 93000: NORMAL
P-R INTERVAL, ECG05: 232 MS
Q-T INTERVAL, ECG07: 452 MS
QRS DURATION, ECG06: 162 MS
QTC CALCULATION (BEZET), ECG08: 508 MS
VENTRICULAR RATE, ECG03: 76 BPM

## 2023-05-11 RX ORDER — HYDROCHLOROTHIAZIDE 25 MG/1
25 TABLET ORAL DAILY
COMMUNITY

## 2023-05-11 RX ORDER — LUTEIN 6 MG
1 TABLET ORAL DAILY
COMMUNITY

## 2023-05-11 RX ORDER — PREDNISONE 10 MG/1
TABLET ORAL DAILY
COMMUNITY

## 2023-05-11 RX ORDER — ALBUTEROL SULFATE 1.25 MG/3ML
SOLUTION RESPIRATORY (INHALATION) 4 TIMES DAILY
COMMUNITY

## 2023-05-11 RX ORDER — MONTELUKAST SODIUM 5 MG/1
10 TABLET, CHEWABLE ORAL NIGHTLY
COMMUNITY

## 2023-05-11 RX ORDER — TERAZOSIN 2 MG/1
1 CAPSULE ORAL NIGHTLY
COMMUNITY

## 2023-05-11 RX ORDER — POLYETHYLENE GLYCOL 3350 17 G/17G
17 POWDER, FOR SOLUTION ORAL DAILY
COMMUNITY

## 2023-05-11 RX ORDER — ACETAMINOPHEN 500 MG
TABLET ORAL EVERY 6 HOURS PRN
COMMUNITY

## 2023-05-11 RX ORDER — GUAIFENESIN 600 MG/1
TABLET, EXTENDED RELEASE ORAL 2 TIMES DAILY
COMMUNITY

## 2023-05-11 RX ORDER — LEVOTHYROXINE SODIUM 0.15 MG/1
TABLET ORAL DAILY
COMMUNITY

## 2023-05-11 RX ORDER — LEVOFLOXACIN 750 MG/1
750 TABLET ORAL DAILY
COMMUNITY

## 2023-05-11 RX ORDER — POTASSIUM CHLORIDE 1.5 G/1.77G
POWDER, FOR SOLUTION ORAL 2 TIMES DAILY WITH MEALS
COMMUNITY
Start: 2020-05-24

## 2023-05-11 RX ORDER — ATORVASTATIN CALCIUM 20 MG/1
10 TABLET, FILM COATED ORAL DAILY
COMMUNITY

## 2023-12-11 ENCOUNTER — HOSPITAL ENCOUNTER (EMERGENCY)
Facility: HOSPITAL | Age: 88
Discharge: HOME | End: 2023-12-11
Attending: STUDENT IN AN ORGANIZED HEALTH CARE EDUCATION/TRAINING PROGRAM
Payer: MEDICARE

## 2023-12-11 ENCOUNTER — APPOINTMENT (OUTPATIENT)
Facility: HOSPITAL | Age: 88
End: 2023-12-11
Payer: MEDICARE

## 2023-12-11 VITALS
TEMPERATURE: 98.1 F | RESPIRATION RATE: 20 BRPM | HEIGHT: 66 IN | WEIGHT: 140 LBS | BODY MASS INDEX: 22.5 KG/M2 | DIASTOLIC BLOOD PRESSURE: 74 MMHG | HEART RATE: 91 BPM | SYSTOLIC BLOOD PRESSURE: 122 MMHG | OXYGEN SATURATION: 93 %

## 2023-12-11 DIAGNOSIS — M79.89 LEG SWELLING: Primary | ICD-10-CM

## 2023-12-11 LAB
ANION GAP SERPL CALC-SCNC: 4 MMOL/L (ref 5–15)
BASOPHILS # BLD: 0.1 K/UL (ref 0–0.1)
BASOPHILS NFR BLD: 1 % (ref 0–1)
BUN SERPL-MCNC: 23 MG/DL (ref 6–20)
BUN/CREAT SERPL: 29 (ref 12–20)
CALCIUM SERPL-MCNC: 8.3 MG/DL (ref 8.5–10.1)
CHLORIDE SERPL-SCNC: 106 MMOL/L (ref 97–108)
CO2 SERPL-SCNC: 30 MMOL/L (ref 21–32)
CREAT SERPL-MCNC: 0.79 MG/DL (ref 0.55–1.02)
DIFFERENTIAL METHOD BLD: ABNORMAL
ECHO BSA: 1.72 M2
EOSINOPHIL # BLD: 0.5 K/UL (ref 0–0.4)
EOSINOPHIL NFR BLD: 7 % (ref 0–7)
ERYTHROCYTE [DISTWIDTH] IN BLOOD BY AUTOMATED COUNT: 14.1 % (ref 11.5–14.5)
GLUCOSE SERPL-MCNC: 114 MG/DL (ref 65–100)
HCT VFR BLD AUTO: 38.2 % (ref 35–47)
HGB BLD-MCNC: 12.3 G/DL (ref 11.5–16)
IMM GRANULOCYTES # BLD AUTO: 0 K/UL (ref 0–0.04)
IMM GRANULOCYTES NFR BLD AUTO: 0 % (ref 0–0.5)
LYMPHOCYTES # BLD: 0.8 K/UL (ref 0.8–3.5)
LYMPHOCYTES NFR BLD: 12 % (ref 12–49)
MCH RBC QN AUTO: 30.8 PG (ref 26–34)
MCHC RBC AUTO-ENTMCNC: 32.2 G/DL (ref 30–36.5)
MCV RBC AUTO: 95.7 FL (ref 80–99)
MONOCYTES # BLD: 0.6 K/UL (ref 0–1)
MONOCYTES NFR BLD: 9 % (ref 5–13)
NEUTS SEG # BLD: 4.5 K/UL (ref 1.8–8)
NEUTS SEG NFR BLD: 71 % (ref 32–75)
NRBC # BLD: 0 K/UL (ref 0–0.01)
NRBC BLD-RTO: 0 PER 100 WBC
PLATELET # BLD AUTO: 295 K/UL (ref 150–400)
PMV BLD AUTO: 10.2 FL (ref 8.9–12.9)
POTASSIUM SERPL-SCNC: 3.9 MMOL/L (ref 3.5–5.1)
RBC # BLD AUTO: 3.99 M/UL (ref 3.8–5.2)
RBC MORPH BLD: ABNORMAL
SODIUM SERPL-SCNC: 140 MMOL/L (ref 136–145)
WBC # BLD AUTO: 6.5 K/UL (ref 3.6–11)

## 2023-12-11 PROCEDURE — 6370000000 HC RX 637 (ALT 250 FOR IP): Performed by: STUDENT IN AN ORGANIZED HEALTH CARE EDUCATION/TRAINING PROGRAM

## 2023-12-11 PROCEDURE — 93971 EXTREMITY STUDY: CPT

## 2023-12-11 PROCEDURE — 80048 BASIC METABOLIC PNL TOTAL CA: CPT

## 2023-12-11 PROCEDURE — 6360000002 HC RX W HCPCS: Performed by: STUDENT IN AN ORGANIZED HEALTH CARE EDUCATION/TRAINING PROGRAM

## 2023-12-11 PROCEDURE — 99284 EMERGENCY DEPT VISIT MOD MDM: CPT

## 2023-12-11 PROCEDURE — 36415 COLL VENOUS BLD VENIPUNCTURE: CPT

## 2023-12-11 PROCEDURE — 85025 COMPLETE CBC W/AUTO DIFF WBC: CPT

## 2023-12-11 PROCEDURE — 96374 THER/PROPH/DIAG INJ IV PUSH: CPT

## 2023-12-11 RX ORDER — FUROSEMIDE 10 MG/ML
20 INJECTION INTRAMUSCULAR; INTRAVENOUS
Status: COMPLETED | OUTPATIENT
Start: 2023-12-11 | End: 2023-12-11

## 2023-12-11 RX ORDER — ACETAMINOPHEN 500 MG
1000 TABLET ORAL
Status: COMPLETED | OUTPATIENT
Start: 2023-12-11 | End: 2023-12-11

## 2023-12-11 RX ORDER — DOXYCYCLINE HYCLATE 100 MG
100 TABLET ORAL 2 TIMES DAILY
Qty: 14 TABLET | Refills: 0 | Status: SHIPPED | OUTPATIENT
Start: 2023-12-11 | End: 2023-12-18

## 2023-12-11 RX ADMIN — ACETAMINOPHEN 1000 MG: 500 TABLET ORAL at 19:17

## 2023-12-11 RX ADMIN — FUROSEMIDE 20 MG: 10 INJECTION, SOLUTION INTRAMUSCULAR; INTRAVENOUS at 21:18

## 2023-12-11 ASSESSMENT — PAIN - FUNCTIONAL ASSESSMENT: PAIN_FUNCTIONAL_ASSESSMENT: NONE - DENIES PAIN

## 2023-12-12 NOTE — ED NOTES
1945: Patient rang call bell stating that she wears oxygen intermittently at home for comfort, requested to be get placed on O2 via nasal cannula. Placed patient on 1 L at this time.       Ladi Silva RN  12/11/23 1952

## 2023-12-12 NOTE — ED PROVIDER NOTES
EMERGENCY DEPARTMENT HISTORY AND PHYSICAL EXAM      Date: 12/11/2023  Patient Name: Gene Arriola    History of Presenting Illness     Chief Complaint   Patient presents with    Leg Swelling     Left leg swelling for 3 days. Denies pain. EMS reports Covid positive for 5 days. VSS, wearing oxygen, sats good for EMS. HPI: History From: patient, History limited by: none  Gene Arriola, 80 y.o. female presents to the ED with cc of left leg swelling. This has been going on on and off for the past few weeks. She has had intermittent pain, swelling and redness of the extremity. She states that is actually usually her right leg to give her problems. She denies fevers, chest pain or shortness of breath. She is usually on 3 L oxygen at baseline, takes Lasix. She denies any injuries. She recently tested positive for COVID, however states these symptoms are all improving. There are no other complaints, changes, or physical findings at this time. PCP: Chelsy Garvin MD    No current facility-administered medications on file prior to encounter.      Current Outpatient Medications on File Prior to Encounter   Medication Sig Dispense Refill    acetaminophen (TYLENOL) 500 MG tablet Take by mouth every 6 hours as needed      albuterol (ACCUNEB) 1.25 MG/3ML nebulizer solution by Nasal route 4 times daily      atorvastatin (LIPITOR) 20 MG tablet Take 0.5 tablets by mouth daily      diclofenac sodium (VOLTAREN) 1 % GEL Apply topically 4 times daily as needed      dilTIAZem HCl ER Coated Beads 300 MG TB24 Take 1 tablet by mouth daily      guaiFENesin (MUCINEX) 600 MG extended release tablet Take by mouth 2 times daily      hydroCHLOROthiazide (HYDRODIURIL) 25 MG tablet Take 1 tablet by mouth daily      levoFLOXacin (LEVAQUIN) 750 MG tablet Take 1 tablet by mouth daily      levothyroxine (SYNTHROID) 150 MCG tablet Take by mouth daily      Lutein 20 MG TABS Take 1 tablet by mouth daily

## 2024-01-07 ENCOUNTER — HOSPITAL ENCOUNTER (EMERGENCY)
Facility: HOSPITAL | Age: 89
Discharge: HOME | End: 2024-01-07
Attending: EMERGENCY MEDICINE
Payer: MEDICARE

## 2024-01-07 ENCOUNTER — APPOINTMENT (OUTPATIENT)
Facility: HOSPITAL | Age: 89
End: 2024-01-07
Payer: MEDICARE

## 2024-01-07 VITALS
SYSTOLIC BLOOD PRESSURE: 145 MMHG | HEART RATE: 86 BPM | RESPIRATION RATE: 20 BRPM | BODY MASS INDEX: 22.5 KG/M2 | DIASTOLIC BLOOD PRESSURE: 70 MMHG | OXYGEN SATURATION: 95 % | WEIGHT: 140 LBS | HEIGHT: 66 IN | TEMPERATURE: 97.7 F
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
## 2024-01-07 DIAGNOSIS — S09.90XA CLOSED HEAD INJURY, INITIAL ENCOUNTER: Primary | ICD-10-CM

## 2024-01-07 DIAGNOSIS — S00.83XA FACIAL HEMATOMA, INITIAL ENCOUNTER: ICD-10-CM

## 2024-01-07 LAB
APPEARANCE UR: CLEAR
BACTERIA URNS QL MICRO: NEGATIVE /HPF
BILIRUB UR QL: NEGATIVE
COLOR UR: ABNORMAL
EPITH CASTS URNS QL MICRO: ABNORMAL /LPF
GLUCOSE UR STRIP.AUTO-MCNC: NEGATIVE MG/DL
HGB UR QL STRIP: NEGATIVE
HYALINE CASTS URNS QL MICRO: ABNORMAL /LPF (ref 0–2)
KETONES UR QL STRIP.AUTO: NEGATIVE MG/DL
LEUKOCYTE ESTERASE UR QL STRIP.AUTO: ABNORMAL
NITRITE UR QL STRIP.AUTO: NEGATIVE
PH UR STRIP: 6 (ref 5–8)
PROT UR STRIP-MCNC: NEGATIVE MG/DL
RBC #/AREA URNS HPF: ABNORMAL /HPF (ref 0–5)
SP GR UR REFRACTOMETRY: 1.01
URINE CULTURE IF INDICATED: ABNORMAL
UROBILINOGEN UR QL STRIP.AUTO: 0.2 EU/DL (ref 0.2–1)
WBC URNS QL MICRO: ABNORMAL /HPF (ref 0–4)

## 2024-01-07 PROCEDURE — 99284 EMERGENCY DEPT VISIT MOD MDM: CPT

## 2024-01-07 PROCEDURE — 72125 CT NECK SPINE W/O DYE: CPT

## 2024-01-07 PROCEDURE — 70450 CT HEAD/BRAIN W/O DYE: CPT

## 2024-01-07 PROCEDURE — 81001 URINALYSIS AUTO W/SCOPE: CPT

## 2024-01-07 RX ORDER — LABETALOL HYDROCHLORIDE 5 MG/ML
10 INJECTION INTRAVENOUS ONCE
Status: DISCONTINUED | OUTPATIENT
Start: 2024-01-07 | End: 2024-01-07

## 2024-01-07 ASSESSMENT — ENCOUNTER SYMPTOMS
SHORTNESS OF BREATH: 0
ABDOMINAL PAIN: 0
DIARRHEA: 0
NAUSEA: 0
VOMITING: 0

## 2024-01-07 ASSESSMENT — PAIN DESCRIPTION - LOCATION: LOCATION: HEAD

## 2024-01-07 NOTE — ED PROVIDER NOTES
EMERGENCY DEPARTMENT HISTORY AND PHYSICAL EXAM     ----------------------------------------------------------------------------  Please note that this dictation was completed with [employer], the computer voice recognition software.  Quite often unanticipated grammatical, syntax, homophones, and other interpretive errors are inadvertently transcribed by the computer software.  Please disregard these errors.  Please excuse any errors that have escaped final proofreading  ----------------------------------------------------------------------------      Date: 1/7/2024  Patient Name: Marlyn Pierre      HISTORY OF PRESENT ILLNESS     Chief Complaint   Patient presents with    Fall     Patient arrives via EMS from [facility] for unwitnessed fall. Patient states she fell out of chair while trying to get up to get coffee, patient does not think she passed out. Patient did sustain head laceration, rehab staff had dressed wound, bleeding appears controlled. No blood thinners per EMS. Patient complains of headache, butt pain, and leg pain. Being treated for cellulitis of lle       History obtainted from:  Patient    Other independent source of history: EMS    HPI: Marlyn Pierre is a 89 y.o. female, with significant pmhx of hypertension, vertigo, dementia, who presents via EMS to the ED with report of unwitnessed, fall where patient struck the front of her head \"trying to get coffee.\"  Patient denies loss of consciousness or pain behind her head related to the fall.  Noted to have left lower extremity cellulitis for which she is currently being treated.      PCP: Stevo Francis MD    Allergy List:   Allergies   Allergen Reactions    Codeine      Other reaction(s): Unknown (comments)    Lexapro [Escitalopram]     Penicillin G      Other reaction(s): Unknown (comments)         Medications:  No current facility-administered medications for this encounter.     Current Outpatient Medications   Medication Sig

## 2024-01-07 NOTE — ED NOTES
Called [facility] rehab to make aware of pts return to facility at this time.

## 2024-01-07 NOTE — ED NOTES
I have reviewed the provider's instructions with the patient, answering all questions to her satisfaction. Pt transported to [facility] by hospital to home.

## 2024-01-07 NOTE — DISCHARGE INSTRUCTIONS
It was a pleasure taking care of you in our Emergency Department today.  We know that when you come to [facility], you are entrusting us with your health, comfort, and safety.  Our physicians and nurses honor that trust, and truly appreciate the opportunity to care for you and your loved ones.      We also value your feedback.  If you receive a survey about your Emergency Department experience today, please fill it out.  We care about our patients' feedback, and we listen to what you have to say.  Thank you!      Dr. Liz Perez MD.

## 2024-02-26 ENCOUNTER — APPOINTMENT (OUTPATIENT)
Facility: HOSPITAL | Age: 89
End: 2024-02-26
Payer: MEDICARE

## 2024-02-26 ENCOUNTER — HOSPITAL ENCOUNTER (EMERGENCY)
Facility: HOSPITAL | Age: 89
Discharge: HOME | End: 2024-02-26
Payer: MEDICARE

## 2024-02-26 VITALS
HEART RATE: 76 BPM | RESPIRATION RATE: 18 BRPM | OXYGEN SATURATION: 93 % | BODY MASS INDEX: 24.3 KG/M2 | DIASTOLIC BLOOD PRESSURE: 63 MMHG | TEMPERATURE: 97.6 F | WEIGHT: 150.57 LBS | SYSTOLIC BLOOD PRESSURE: 107 MMHG

## 2024-02-26 DIAGNOSIS — S51.812A SKIN TEAR OF LEFT FOREARM WITHOUT COMPLICATION, INITIAL ENCOUNTER: ICD-10-CM
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
## 2024-02-26 DIAGNOSIS — S00.03XA HEMATOMA OF SCALP, INITIAL ENCOUNTER: ICD-10-CM

## 2024-02-26 PROCEDURE — 70486 CT MAXILLOFACIAL W/O DYE: CPT

## 2024-02-26 PROCEDURE — 72125 CT NECK SPINE W/O DYE: CPT

## 2024-02-26 PROCEDURE — 73090 X-RAY EXAM OF FOREARM: CPT

## 2024-02-26 PROCEDURE — 6370000000 HC RX 637 (ALT 250 FOR IP)

## 2024-02-26 PROCEDURE — 99284 EMERGENCY DEPT VISIT MOD MDM: CPT

## 2024-02-26 PROCEDURE — 70450 CT HEAD/BRAIN W/O DYE: CPT

## 2024-02-26 RX ORDER — ACETAMINOPHEN 500 MG
1000 TABLET ORAL EVERY 8 HOURS PRN
Qty: 120 TABLET | Refills: 0 | Status: SHIPPED | OUTPATIENT
Start: 2024-02-26

## 2024-02-26 RX ORDER — BACITRACIN ZINC AND POLYMYXIN B SULFATE 500; 1000 [USP'U]/G; [USP'U]/G
OINTMENT TOPICAL
Qty: 15 G | Refills: 0 | Status: SHIPPED | OUTPATIENT
Start: 2024-02-26

## 2024-02-26 RX ORDER — ACETAMINOPHEN 500 MG
1000 TABLET ORAL
Status: COMPLETED | OUTPATIENT
Start: 2024-02-26 | End: 2024-02-26

## 2024-02-26 RX ADMIN — ACETAMINOPHEN 1000 MG: 500 TABLET ORAL at 21:38

## 2024-02-26 ASSESSMENT — PAIN SCALES - GENERAL: PAINLEVEL_OUTOF10: 0

## 2024-02-27 NOTE — ED PROVIDER NOTES
[facility] EMERGENCY DEPT  EMERGENCY DEPARTMENT ENCOUNTER       Pt Name: Marlyn Pierre  MRN: [id number]  Birthdate 8/31/1934  Date of Evaluation: 2/26/2024  Provider: SHANICE Ferro - NP   PCP: Stevo Francis MD  Note Started: 11:11 PM 2/26/24     CHIEF COMPLAINT       Chief Complaint   Patient presents with    Fall     Pt arrives via EMS from [facility] after witnessed fall about 25 minutes, pt went to sit in recliner and missed recliner, hit L side of head and has skin tears to L arm - per staff at facility pt answered questions to baseline, pt not on blood thinners per EMS,         HISTORY OF PRESENT ILLNESS: 1 or more elements      History From: Patient and EMS  HPI Limitations Dementia     Marlyn Pierre is a 89 y.o. female with PMHx of anemia, COPD, dementia, hyperlipidemia, hypertension, hypothyroidism, vertigo who presents to the ED today s/p witnessed fall at [facility] where patient lives.  Per facility staff, patient went to sit in recliner and just been placed to hit the left side of her head on the floor.  No LOC.  Patient denies nausea, vomiting, headache, blurry vision.  Patient complaining of left forearm pain.  No blood thinners.     See MDM Documentation for further HPI and PMHx     Nursing Notes were all reviewed and agreed with or any disagreements were addressed in the HPI.     REVIEW OF SYSTEMS      Review of Systems     Positives and Pertinent negatives as per HPI.    PAST HISTORY     Past Medical History:  Past Medical History:   Diagnosis Date    Anemia     COPD (chronic obstructive pulmonary disease) (HCC)     Hyperlipidemia     Hypertension     Hypothyroidism     Vertigo        Past Surgical History:  Past Surgical History:   Procedure Laterality Date    CHOLECYSTECTOMY      removed    HEENT      cataract surgery    ORTHOPEDIC SURGERY      arthhroscopic right knee & left shoulder       Family History:  No family history on

## 2024-02-27 NOTE — ED NOTES
Provider reviewed discharge instructions with the patient. The patient verbalized understanding. All questions and concerns were addressed. The patient is discharged with [facility] with instructions and prescriptions in hand.  Pt is alert and oriented x 4. Respirations are clear and unlabored.

## 2024-04-26 ENCOUNTER — HOSPITAL ENCOUNTER (EMERGENCY)
Facility: HOSPITAL | Age: 89
Discharge: HOME | End: 2024-04-26
Payer: MEDICARE

## 2024-04-26 ENCOUNTER — APPOINTMENT (OUTPATIENT)
Facility: HOSPITAL | Age: 89
End: 2024-04-26
Payer: MEDICARE

## 2024-04-26 VITALS
WEIGHT: 139 LBS | RESPIRATION RATE: 19 BRPM | HEART RATE: 60 BPM | SYSTOLIC BLOOD PRESSURE: 122 MMHG | TEMPERATURE: 97.8 F | DIASTOLIC BLOOD PRESSURE: 72 MMHG | BODY MASS INDEX: 22.44 KG/M2 | OXYGEN SATURATION: 97 %
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
## 2024-04-26 DIAGNOSIS — S09.90XA INJURY OF HEAD, INITIAL ENCOUNTER: ICD-10-CM

## 2024-04-26 DIAGNOSIS — S13.9XXA NECK SPRAIN, INITIAL ENCOUNTER: ICD-10-CM

## 2024-04-26 PROCEDURE — 70450 CT HEAD/BRAIN W/O DYE: CPT

## 2024-04-26 PROCEDURE — 93005 ELECTROCARDIOGRAM TRACING: CPT | Performed by: PHYSICIAN ASSISTANT

## 2024-04-26 PROCEDURE — 99284 EMERGENCY DEPT VISIT MOD MDM: CPT

## 2024-04-26 PROCEDURE — 6370000000 HC RX 637 (ALT 250 FOR IP): Performed by: PHYSICIAN ASSISTANT

## 2024-04-26 PROCEDURE — 72125 CT NECK SPINE W/O DYE: CPT

## 2024-04-26 RX ORDER — ACETAMINOPHEN 325 MG/1
650 TABLET ORAL
Status: COMPLETED | OUTPATIENT
Start: 2024-04-26 | End: 2024-04-26

## 2024-04-26 RX ADMIN — ACETAMINOPHEN 650 MG: 325 TABLET ORAL at 17:26

## 2024-04-26 ASSESSMENT — PAIN SCALES - GENERAL: PAINLEVEL_OUTOF10: 6

## 2024-04-26 ASSESSMENT — PAIN - FUNCTIONAL ASSESSMENT: PAIN_FUNCTIONAL_ASSESSMENT: 0-10

## 2024-04-26 ASSESSMENT — PAIN DESCRIPTION - LOCATION: LOCATION: HEAD

## 2024-04-26 ASSESSMENT — LIFESTYLE VARIABLES
HOW OFTEN DO YOU HAVE A DRINK CONTAINING ALCOHOL: NEVER
HOW MANY STANDARD DRINKS CONTAINING ALCOHOL DO YOU HAVE ON A TYPICAL DAY: PATIENT DOES NOT DRINK

## 2024-04-26 NOTE — ED TRIAGE NOTES
BIBEMS from assisted living. Pt was on toilet slipped and fell hitting head. Denies thinners, no LOC. Pt denies dizziness prior to fall. Reports, head pain no other injuries. PMH-Dementis. Pt is alert, oriented to self and place, no visible injuries or deformites. Pt is in no distress

## 2024-04-26 NOTE — ED NOTES
Verbal ED summary and SBAR given to oncoming/receiving ED RN (Reginaldo) by offgoing RN (Shaun). Care transitioned.     - Side rails x2.  - Call light within reach.  - Pt is GCS 15 but AO1-2.   - No acute distress reported or observed, but she has her nasal cannula resting on her chest and O2 sensor is off. This RN adjusted.

## 2024-04-27 LAB
EKG ATRIAL RATE: 119 BPM
EKG DIAGNOSIS: NORMAL
EKG P-R INTERVAL: 184 MS
EKG Q-T INTERVAL: 232 MS
EKG QRS DURATION: 106 MS
EKG QTC CALCULATION (BAZETT): 318 MS
EKG R AXIS: 51 DEGREES
EKG T AXIS: 58 DEGREES
EKG VENTRICULAR RATE: 113 BPM

## 2024-04-27 NOTE — ED PROVIDER NOTES
process is intact. The craniocervical junction is within normal limits. Degenerative changes. Atherosclerosis.     No acute fracture nor subluxation.    CT HEAD WO CONTRAST    Result Date: 4/26/2024  EXAM: CT HEAD WO CONTRAST INDICATION: fall with head injury COMPARISON: CT head 2/26/2024. CONTRAST: None. TECHNIQUE: Unenhanced CT of the head was performed using 5 mm images. Brain and bone windows were generated. Coronal and sagittal reformats. CT dose reduction was achieved through use of a standardized protocol tailored for this examination and automatic exposure control for dose modulation.  FINDINGS: Generalized volume loss. White matter hypodensities may reflect chronic microangiopathic change. There is no intracranial hemorrhage, extra-axial collection, or mass effect. The basilar cisterns are open. No CT evidence of acute infarct. The bone windows demonstrate no abnormalities. The visualized portions of the paranasal sinuses and mastoid air cells are clear. Bilateral lens replacements.     No acute abnormality.       PROCEDURES   Unless otherwise noted below, none  Procedures     CRITICAL CARE TIME   Patient does not meet Critical Care Time, 0 minutes     EMERGENCY DEPARTMENT COURSE and DIFFERENTIAL DIAGNOSIS/MDM   Vitals:    Vitals:    04/26/24 1623 04/26/24 1920 04/26/24 1925 04/26/24 1945   BP:    122/72   Pulse:  63 69 60   Resp:  20 17 19   Temp:       SpO2: 94% (!) 88% 96% 97%   Weight:            Patient was given the following medications:  Medications   acetaminophen (TYLENOL) tablet 650 mg (650 mg Oral Given 4/26/24 1726)       CONSULTS: (Who and What was discussed)  None    Chronic Conditions:  has a past medical history of Anemia, COPD (chronic obstructive pulmonary disease) (HCC), Dementia (HCC), Hyperlipidemia, Hypertension, Hypothyroidism, and Vertigo.     Social Determinants affecting Dx or Tx: None    Records Reviewed (source and summary of external records): Nursing Notes and [facility]